# Patient Record
Sex: FEMALE | Race: OTHER
[De-identification: names, ages, dates, MRNs, and addresses within clinical notes are randomized per-mention and may not be internally consistent; named-entity substitution may affect disease eponyms.]

---

## 2019-03-01 ENCOUNTER — HOSPITAL ENCOUNTER (EMERGENCY)
Dept: HOSPITAL 72 - EMR | Age: 69
LOS: 1 days | Discharge: ANOTHER HOSPITAL | End: 2019-03-02
Payer: COMMERCIAL

## 2019-03-01 VITALS — WEIGHT: 150 LBS | BODY MASS INDEX: 26.58 KG/M2 | HEIGHT: 63 IN

## 2019-03-01 VITALS — DIASTOLIC BLOOD PRESSURE: 55 MMHG | SYSTOLIC BLOOD PRESSURE: 112 MMHG

## 2019-03-01 VITALS — SYSTOLIC BLOOD PRESSURE: 116 MMHG | DIASTOLIC BLOOD PRESSURE: 61 MMHG

## 2019-03-01 DIAGNOSIS — I95.89: ICD-10-CM

## 2019-03-01 DIAGNOSIS — R74.8: ICD-10-CM

## 2019-03-01 DIAGNOSIS — N10: Primary | ICD-10-CM

## 2019-03-01 LAB
ADD MANUAL DIFF: YES
ALBUMIN SERPL-MCNC: 3.4 G/DL (ref 3.4–5)
ALBUMIN/GLOB SERPL: 0.8 {RATIO} (ref 1–2.7)
ALP SERPL-CCNC: 147 U/L (ref 46–116)
ALT SERPL-CCNC: 64 U/L (ref 12–78)
ANION GAP SERPL CALC-SCNC: 14 MMOL/L (ref 5–15)
AST SERPL-CCNC: 47 U/L (ref 15–37)
BILIRUB DIRECT SERPL-MCNC: 0.6 MG/DL (ref 0–0.3)
BILIRUB SERPL-MCNC: 1.4 MG/DL (ref 0.2–1)
BUN SERPL-MCNC: 15 MG/DL (ref 7–18)
CALCIUM SERPL-MCNC: 9.5 MG/DL (ref 8.5–10.1)
CHLORIDE SERPL-SCNC: 98 MMOL/L (ref 98–107)
CK MB SERPL-MCNC: 0.8 NG/ML (ref 0–3.6)
CK SERPL-CCNC: 396 U/L (ref 26–308)
CO2 SERPL-SCNC: 23 MMOL/L (ref 21–32)
CREAT SERPL-MCNC: 1.2 MG/DL (ref 0.55–1.3)
ERYTHROCYTE [DISTWIDTH] IN BLOOD BY AUTOMATED COUNT: 11.3 % (ref 11.6–14.8)
GLOBULIN SER-MCNC: 4.5 G/DL
HCT VFR BLD CALC: 41.5 % (ref 37–47)
HGB BLD-MCNC: 14.2 G/DL (ref 12–16)
MCV RBC AUTO: 90 FL (ref 80–99)
PLATELET # BLD: 119 K/UL (ref 150–450)
POTASSIUM SERPL-SCNC: 3.5 MMOL/L (ref 3.5–5.1)
RBC # BLD AUTO: 4.62 M/UL (ref 4.2–5.4)
SODIUM SERPL-SCNC: 134 MMOL/L (ref 136–145)
WBC # BLD AUTO: 7.7 K/UL (ref 4.8–10.8)

## 2019-03-01 PROCEDURE — 82550 ASSAY OF CK (CPK): CPT

## 2019-03-01 PROCEDURE — 36415 COLL VENOUS BLD VENIPUNCTURE: CPT

## 2019-03-01 PROCEDURE — 85007 BL SMEAR W/DIFF WBC COUNT: CPT

## 2019-03-01 PROCEDURE — 82248 BILIRUBIN DIRECT: CPT

## 2019-03-01 PROCEDURE — 96375 TX/PRO/DX INJ NEW DRUG ADDON: CPT

## 2019-03-01 PROCEDURE — 96376 TX/PRO/DX INJ SAME DRUG ADON: CPT

## 2019-03-01 PROCEDURE — 96361 HYDRATE IV INFUSION ADD-ON: CPT

## 2019-03-01 PROCEDURE — 84484 ASSAY OF TROPONIN QUANT: CPT

## 2019-03-01 PROCEDURE — 96365 THER/PROPH/DIAG IV INF INIT: CPT

## 2019-03-01 PROCEDURE — 81003 URINALYSIS AUTO W/O SCOPE: CPT

## 2019-03-01 PROCEDURE — 74177 CT ABD & PELVIS W/CONTRAST: CPT

## 2019-03-01 PROCEDURE — 80053 COMPREHEN METABOLIC PANEL: CPT

## 2019-03-01 PROCEDURE — 93005 ELECTROCARDIOGRAM TRACING: CPT

## 2019-03-01 PROCEDURE — 71045 X-RAY EXAM CHEST 1 VIEW: CPT

## 2019-03-01 PROCEDURE — 82553 CREATINE MB FRACTION: CPT

## 2019-03-01 PROCEDURE — 99285 EMERGENCY DEPT VISIT HI MDM: CPT

## 2019-03-01 PROCEDURE — 96367 TX/PROPH/DG ADDL SEQ IV INF: CPT

## 2019-03-01 PROCEDURE — 85025 COMPLETE CBC W/AUTO DIFF WBC: CPT

## 2019-03-01 NOTE — EMERGENCY ROOM REPORT
History of Present Illness


General


Chief Complaint:  Flu Like Symptoms


Source:  Patient


 (Mel Humphries )





Present Illness


HPI


Patient reports that she has been having vomiting and diarrhea since Tuesday


She was seen by her primary physician and placed on medication for possible UTI


However reports that her symptoms have continued to worsen she has diffuse 

abdominal pain continues to have vomiting denies any blood in the stool





Denies any chest pain or shortness of breath denies any recent travel




















 (Mel Humphries DO)


Allergies:  


Coded Allergies:  


     No Known Allergies (Unverified , 3/1/19)





Patient History


Past Medical History:  see triage record


Pertinent Family History:  none


Last Menstrual Period:  LYNN


Pregnant Now:  No


Reviewed Nursing Documentation:  PMH: Agreed; PSxH: Agreed (Mel Humphries DO)





Nursing Documentation-PMH


Past Medical History:  No Stated History


 (Mel Humphries DO)





Review of Systems


All Other Systems:  negative except mentioned in HPI


 (Mel Humphries DO)





Physical Exam





Vital Signs








  Date Time  Temp Pulse Resp B/P (MAP) Pulse Ox O2 Delivery O2 Flow Rate FiO2


 


3/1/19 19:40 99.9 101 16 94/55 92   








Sp02 EP Interpretation:  reviewed, normal


General Appearance:  well appearing, no apparent distress


Head:  normocephalic, atraumatic


Eyes:  bilateral eye PERRL, bilateral eye EOMI


ENT:  hearing grossly normal, TMs + canals normal, uvula midline, dry mucus 

membranes


Neck:  full range of motion, supple, no meningismus, no bony tend


Respiratory:  lungs clear, normal breath sounds, no rhonchi, no respiratory 

distress, no retraction, no accessory muscle use


Cardiovascular #1:  normal peripheral pulses, regular rate, rhythm, no edema, 

no gallop, no JVD, no murmur


Gastrointestinal:  normal bowel sounds, non tender, soft, no mass, no 

organomegaly, non-distended, no guarding, no hernia, no pulsatile mass, no 

rebound


Genitourinary:  no CVA tenderness


Musculoskeletal:  back normal


Neurologic:  oriented x3, responsive, CNs III-XII nml as tested, motor strength/

tone normal, sensory intact


Psychiatric:  mood/affect normal


Skin:  normal color, no rash, warm/dry, palpation normal


Lymphatic:  normal inspection, no adenopathy


 (Mel Humphries DO)





Medical Decision Making


Diagnostic Impression:  


 Primary Impression:  


 Acute pyelonephritis


 Additional Impressions:  


 Elevated liver enzymes


 Transient hypotension


ER Course


Please see above note.


Patient and family report UTI has been on Macrobid and Pyridium for 3 days.  

Still felt ill and worsened.


Discussed with Dr Curiel.  Will accept either to urgent care of [facility].  Discussed 

results with family and patient.


UA with pyuria and nitrites.  CT with inflammation of kidney.  Antibiotics 

ordered along with IV hydration and analgesia.


Blood pressure better with fluids.


Patient improved and stable for transfer.





Laboratory Tests








Test


  3/1/19


20:35


 


White Blood Count


  7.7 K/UL


(4.8-10.8)


 


Red Blood Count


  4.62 M/UL


(4.20-5.40)


 


Hemoglobin


  14.2 G/DL


(12.0-16.0)


 


Hematocrit


  41.5 %


(37.0-47.0)


 


Mean Corpuscular Volume 90 FL (80-99)  


 


Mean Corpuscular Hemoglobin


  30.8 PG


(27.0-31.0)


 


Mean Corpuscular Hemoglobin


Concent 34.3 G/DL


(32.0-36.0)


 


Red Cell Distribution Width


  11.3 %


(11.6-14.8)  L


 


Platelet Count


  119 K/UL


(150-450)  L


 


Mean Platelet Volume


  8.0 FL


(6.5-10.1)


 


Neutrophils (%) (Auto)


  % (45.0-75.0)


 


 


Lymphocytes (%) (Auto)


  % (20.0-45.0)


 


 


Monocytes (%) (Auto)  % (1.0-10.0)  


 


Eosinophils (%) (Auto)  % (0.0-3.0)  


 


Basophils (%) (Auto)  % (0.0-2.0)  


 


Differential Total Cells


Counted 100  


 


 


Neutrophils % (Manual) 84 % (45-75)  H


 


Lymphocytes % (Manual) 9 % (20-45)  L


 


Monocytes % (Manual) 3 % (1-10)  


 


Eosinophils % (Manual) 0 % (0-3)  


 


Basophils % (Manual) 0 % (0-2)  


 


Band Neutrophils 4 % (0-8)  


 


Platelet Estimate Decreased  L


 


Platelet Morphology Normal  


 


Red Blood Cell Morphology Normal  


 


Urine Color Orange  


 


Urine Appearance Clear  


 


Urine pH 6.5 (4.5-8.0)  


 


Urine Specific Gravity


  1.005


(1.005-1.035)


 


Urine Protein


  3+ (NEGATIVE)


H


 


Urine Glucose (UA)


  Negative


(NEGATIVE)


 


Urine Ketones


  1+ (NEGATIVE)


H


 


Urine Blood


  2+ (NEGATIVE)


H


 


Urine Nitrite


  Positive


(NEGATIVE)  H


 


Urine Bilirubin


  2+ (NEGATIVE)


H


 


Urine Ictotest Pending  


 


Urine Urobilinogen


  12 MG/DL


(0.0-1.0)  H


 


Urine Leukocyte Esterase


  2+ (NEGATIVE)


H


 


Urine RBC


  2-4 /HPF (0 -


2)  H


 


Urine WBC


  5-10 /HPF (0 -


2)  H


 


Urine Squamous Epithelial


Cells Few /LPF


(NONE/OCC)


 


Urine Bacteria


  Few /HPF


(NONE)


 


Sodium Level


  134 MMOL/L


(136-145)  L


 


Potassium Level


  3.5 MMOL/L


(3.5-5.1)


 


Chloride Level


  98 MMOL/L


()


 


Carbon Dioxide Level


  23 MMOL/L


(21-32)


 


Anion Gap


  14 mmol/L


(5-15)


 


Blood Urea Nitrogen


  15 mg/dL


(7-18)


 


Creatinine


  1.2 MG/DL


(0.55-1.30)


 


Estimate Glomerular


Filtration Rate 44.5 mL/min


(>60)


 


Glucose Level


  209 MG/DL


()  H


 


Calcium Level


  9.5 MG/DL


(8.5-10.1)


 


Total Bilirubin


  1.4 MG/DL


(0.2-1.0)  H


 


Direct Bilirubin


  0.6 MG/DL


(0.0-0.3)  H


 


Aspartate Amino Transferase


(AST) 47 U/L (15-37)


H


 


Alanine Aminotransferase (ALT)


  64 U/L (12-78)


 


 


Alkaline Phosphatase


  147 U/L


()  H


 


Total Creatine Kinase


  396 U/L


()  H


 


Creatine Kinase MB


  0.8 NG/ML


(0.0-3.6)


 


Creatine Kinase MB Relative


Index 0.2  


 


 


Troponin I


  0.000 ng/mL


(0.000-0.056)


 


Total Protein


  7.9 G/DL


(6.4-8.2)


 


Albumin


  3.4 G/DL


(3.4-5.0)


 


Globulin 4.5 g/dL  


 


Albumin/Globulin Ratio


  0.8 (1.0-2.7)


L








 (Poli Davalos MD)





CT/MRI/US Diagnostic Results


CT/MRI/US Diagnostic Results :  


   Imaging Test Ordered:  abd/pelvis


   Impression


inflammation of kidney


Low attenuation changes in the right kidney and enhancement in the urothelium.  

No hydronephrosis or obstructing stone.  Findings suggest pyelonephritis in the 

proper clinical setting.  There is a broader differential.


 (Poli Davalos MD)





Last Vital Signs








  Date Time  Temp Pulse Resp B/P (MAP) Pulse Ox O2 Delivery O2 Flow Rate FiO2


 


3/1/19 19:40 99.9 101 16 94/55 92   








 (Mel Humphries DO)





Last Vital Signs








  Date Time  Temp Pulse Resp B/P (MAP) Pulse Ox O2 Delivery O2 Flow Rate FiO2


 


3/2/19 01:27 98.4 70 16 110/49 96 Room Air  








Status:  improved


 (Poli Davalos MD)


Disposition:  XFER [facility]OS[facility]


Condition:  Serious


Scripts


No Active Prescriptions or Reported Meds











Mel Humphries DO Mar 1, 2019 20:51


Poli Davalos MD Mar 1, 2019 23:02

## 2019-03-01 NOTE — DIAGNOSTIC IMAGING REPORT
EXAM:

  CT Abdomen and Pelvis With Intravenous Contrast

 

CLINICAL HISTORY:

  PAIN

 

TECHNIQUE:

  Axial computed tomography images of the abdomen and pelvis with 

intravenous contrast.  CTDI is 14.79 mGy and DLP is 726 mGy-cm.  One or 

more of the following dose reduction techniques were used: automated 

exposure control, adjustment of the mA and/or kV according to patient 

size, use of iterative reconstruction technique.

 

COMPARISON:

  No relevant prior studies available.

 

FINDINGS:

  Lung bases:  Unremarkable.

 

 ABDOMEN:

  Liver:  Fatty liver.

  Gallbladder and bile ducts:  No calcified stones.  No ductal dilation.

  Pancreas:  Unremarkable.

  Spleen:  Unremarkable.

  Adrenals:  Unremarkable.

  Kidneys and ureters:  Low attenuation changes in the right kidney and 

enhancement in the urothelium.  No hydronephrosis or obstructing stone.

  Stomach and bowel:  No froy mural thickening.  Nonobstructive bowel 

gas pattern.

 

 PELVIS:

  Appendix:  No findings to suggest acute appendicitis.

  Bladder:  Unremarkable.

  Reproductive:  Probable fibroid uterus.

 

 ABDOMEN and PELVIS:

  Intraperitoneal space:  Unremarkable.

  Bones/joints:  Spondylolysis at L5 with grade 1 spondylolisthesis L5-S1.

 

  Soft tissues:  Edema in the anterior pararenal region.

  Vasculature:  Unremarkable.  No abdominal aortic aneurysm.

  Lymph nodes:  No enlarged lymph nodes.

 

IMPRESSION:     

  Low attenuation changes in the right kidney and enhancement in the 

urothelium.  No hydronephrosis or obstructing stone.  Findings suggest 

pyelonephritis in the proper clinical setting.  There is a broader 

differential.

## 2019-03-01 NOTE — NUR
ED Nurse Note:





pt walked in c/o flu like sx since tuesday, n/v/body ache and chils but denies 
fever. Pt AA&ox4, gcs=15, skin warm and dry, resp even and unlabored on RA, 
-n/v/d at this time, ambulates w/ steady gait, [place] speaking, will cont 
monitor. daughter at the bedside.

## 2019-03-01 NOTE — DIAGNOSTIC IMAGING REPORT
EXAM:

  XR Chest, 1 View

 

CLINICAL HISTORY:

  CP

 

TECHNIQUE:

  Frontal view of the chest.

 

COMPARISON:

  No relevant prior studies available.

 

FINDINGS:

  Lungs:  No consolidation.

  Pleural space:  Unremarkable.  No pneumothorax.

  Heart:  Unremarkable.  No cardiomegaly.

  Mediastinum:  Unremarkable.

  Bones/joints:  No acute fracture.

 

IMPRESSION:     

  No acute cardiopulmonary disease.

## 2019-03-02 VITALS — DIASTOLIC BLOOD PRESSURE: 58 MMHG | SYSTOLIC BLOOD PRESSURE: 119 MMHG

## 2019-03-02 VITALS — DIASTOLIC BLOOD PRESSURE: 49 MMHG | SYSTOLIC BLOOD PRESSURE: 110 MMHG

## 2019-03-02 LAB
APPEARANCE UR: CLEAR
APTT PPP: (no result) S
GLUCOSE UR STRIP-MCNC: NEGATIVE MG/DL
KETONES UR QL STRIP: (no result)
LEUKOCYTE ESTERASE UR QL STRIP: (no result)
NITRITE UR QL STRIP: POSITIVE
PH UR STRIP: 6.5 [PH] (ref 4.5–8)
PROT UR QL STRIP: (no result)
SP GR UR STRIP: 1 (ref 1–1.03)
UROBILINOGEN UR-MCNC: 12 MG/DL (ref 0–1)

## 2019-03-02 NOTE — NUR
ED Nurse Note:



spoke with Nurse Madai from [facility] regarding pt's condition and 
transfer. amb at the bedside for transfer. iv intact and patent.

## 2019-03-02 NOTE — NUR
ED Nurse Note:



pt ambulated to restroom w/ minimal assist, pt ambulatory w/ steady gait, urine 
sample obtained and sent to lab.